# Patient Record
Sex: FEMALE | URBAN - METROPOLITAN AREA
[De-identification: names, ages, dates, MRNs, and addresses within clinical notes are randomized per-mention and may not be internally consistent; named-entity substitution may affect disease eponyms.]

---

## 2019-06-24 ENCOUNTER — APPOINTMENT (RX ONLY)
Dept: URBAN - METROPOLITAN AREA CLINIC 99 | Facility: CLINIC | Age: 84
Setting detail: DERMATOLOGY
End: 2019-06-24

## 2019-06-24 DIAGNOSIS — L42 PITYRIASIS ROSEA: ICD-10-CM

## 2019-06-24 PROBLEM — I25.10 ATHEROSCLEROTIC HEART DISEASE OF NATIVE CORONARY ARTERY WITHOUT ANGINA PECTORIS: Status: ACTIVE | Noted: 2019-06-24

## 2019-06-24 PROBLEM — E78.5 HYPERLIPIDEMIA, UNSPECIFIED: Status: ACTIVE | Noted: 2019-06-24

## 2019-06-24 PROBLEM — I10 ESSENTIAL (PRIMARY) HYPERTENSION: Status: ACTIVE | Noted: 2019-06-24

## 2019-06-24 PROBLEM — K21.9 GASTRO-ESOPHAGEAL REFLUX DISEASE WITHOUT ESOPHAGITIS: Status: ACTIVE | Noted: 2019-06-24

## 2019-06-24 PROCEDURE — ? COUNSELING

## 2019-06-24 PROCEDURE — 99202 OFFICE O/P NEW SF 15 MIN: CPT

## 2019-06-24 PROCEDURE — ? RECOMMENDATIONS

## 2019-06-24 PROCEDURE — ? PRESCRIPTION

## 2019-06-24 RX ORDER — TRIAMCINOLONE ACETONIDE 1 MG/ML
LOTION TOPICAL
Qty: 1 | Refills: 0 | Status: ERX | COMMUNITY
Start: 2019-06-24

## 2019-06-24 RX ADMIN — TRIAMCINOLONE ACETONIDE: 1 LOTION TOPICAL at 14:02

## 2019-06-24 ASSESSMENT — LOCATION DETAILED DESCRIPTION DERM
LOCATION DETAILED: LEFT LATERAL ABDOMEN
LOCATION DETAILED: LEFT INFERIOR LATERAL LOWER BACK
LOCATION DETAILED: RIGHT INFERIOR MEDIAL LOWER BACK

## 2019-06-24 ASSESSMENT — LOCATION SIMPLE DESCRIPTION DERM
LOCATION SIMPLE: ABDOMEN
LOCATION SIMPLE: RIGHT LOWER BACK
LOCATION SIMPLE: LEFT LOWER BACK

## 2019-06-24 ASSESSMENT — LOCATION ZONE DERM: LOCATION ZONE: TRUNK

## 2019-06-24 NOTE — PROCEDURE: REASSURANCE
Hide Additional Notes?: No
Detail Level: Detailed
Additional Notes (Optional): Pt reassured that it does not look like a drug rash, or an allergic contact rash pt should let rash run its course.

## 2019-06-24 NOTE — PROCEDURE: RECOMMENDATIONS
Recommendation Preamble: The following recommendations were made during the visit:
Detail Level: Detailed
Recommendations (Free Text): Claritin daily

## 2019-06-24 NOTE — HPI: RASH
What Type Of Note Output Would You Prefer (Optional)?: Bullet Format
How Severe Is Your Rash?: mild
Is This A New Presentation, Or A Follow-Up?: Rash
Additional History: Traveled to [facility] and [place], heat and cold aggregates the rash

## 2019-07-22 ENCOUNTER — APPOINTMENT (RX ONLY)
Dept: URBAN - METROPOLITAN AREA CLINIC 99 | Facility: CLINIC | Age: 84
Setting detail: DERMATOLOGY
End: 2019-07-22

## 2019-07-22 DIAGNOSIS — L85.3 XEROSIS CUTIS: ICD-10-CM

## 2019-07-22 DIAGNOSIS — L42 PITYRIASIS ROSEA: ICD-10-CM | Status: RESOLVED

## 2019-07-22 PROCEDURE — ? COUNSELING

## 2019-07-22 PROCEDURE — 99213 OFFICE O/P EST LOW 20 MIN: CPT

## 2019-07-22 PROCEDURE — ? RECOMMENDATIONS

## 2019-07-22 ASSESSMENT — LOCATION SIMPLE DESCRIPTION DERM
LOCATION SIMPLE: LEFT LOWER BACK
LOCATION SIMPLE: RIGHT BREAST
LOCATION SIMPLE: ABDOMEN
LOCATION SIMPLE: UPPER BACK

## 2019-07-22 ASSESSMENT — SEVERITY ASSESSMENT: SEVERITY: CLEAR

## 2019-07-22 ASSESSMENT — LOCATION DETAILED DESCRIPTION DERM
LOCATION DETAILED: RIGHT MEDIAL BREAST 3-4:00 REGION
LOCATION DETAILED: INFERIOR THORACIC SPINE
LOCATION DETAILED: LEFT LATERAL ABDOMEN
LOCATION DETAILED: LEFT INFERIOR LATERAL LOWER BACK

## 2019-07-22 ASSESSMENT — LOCATION ZONE DERM: LOCATION ZONE: TRUNK

## 2019-07-22 NOTE — PROCEDURE: RECOMMENDATIONS
Detail Level: Detailed
Recommendations (Free Text): Daily moisturizers, Do not use TAC on the affected area, drink plenty of water
Recommendation Preamble: The following recommendations were made during the visit:
Recommendations (Free Text): Claritin daily, daily moisturizers, Drink plenty of water.